# Patient Record
Sex: FEMALE | Race: WHITE | NOT HISPANIC OR LATINO | Employment: STUDENT | URBAN - METROPOLITAN AREA
[De-identification: names, ages, dates, MRNs, and addresses within clinical notes are randomized per-mention and may not be internally consistent; named-entity substitution may affect disease eponyms.]

---
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
## 2024-03-08 DIAGNOSIS — G47.00 INSOMNIA, UNSPECIFIED TYPE: ICD-10-CM

## 2024-03-11 RX ORDER — TRAZODONE HYDROCHLORIDE 50 MG/1
TABLET, FILM COATED ORAL
Qty: 45 TABLET | Refills: 1 | OUTPATIENT
Start: 2024-03-11

## 2024-03-11 NOTE — TELEPHONE ENCOUNTER
According to the last office visit, she had a Psychiatry appointment scheduled and was going to transitioning care to Psychiatry.   A refill was given so that she was not out of medication prior to getting in for Psychiatry evaluation.   If more refills are needed, she will need an appointment, virtual visit will be fine.   Kristen Kehr PA-C

## 2024-03-12 ENCOUNTER — TELEPHONE (OUTPATIENT)
Dept: FAMILY MEDICINE | Facility: CLINIC | Age: 19
End: 2024-03-12

## 2024-03-12 NOTE — TELEPHONE ENCOUNTER
"RN reviewed patient's chart.     Patient had office visit on 23 with Kristen Kehr, PA-C:  Insomnia, unspecified type  - traZODone (DESYREL) 50 MG tablet; 1/2 tablet  BY MOUTH AT BEDTIME as needed for sleep    Medication sent to Claudia in [place] on 23:   traZODone (DESYREL) 50 MG tablet   Si/2 tablet  BY MOUTH AT BEDTIME as needed for sleep     Provider note stated \"Mother is requesting refills of her medication until they can get to the scheduled Psychiatric appointments. Her medications include lexapro, trazodone and hydroxyzine.\"    RN attempted to update Care Everywhere but unable to see if patient saw Psych since 23 appointment with Zonia.    ___________________________________________      RN attempted to contact patient to clarify message received below stating patient now \"taking 1 tablet daily now\". She was prescribed 1/2 tablet by Zonia on 23.    Patient unavailable at the time of the call.  M requesting patient return call to clinic.     If patient returns call to clinic, please clarify what dose of trazodone she is taking and find out if another provider (Psychiatry?) advised her to adjust the dose. Thank you.     Jill Reynolds, RN, BSN, PHN  [facility]  Nurse Triage, Family Practice    "

## 2024-03-13 NOTE — TELEPHONE ENCOUNTER
RN attempted to reach patient on 3/13/24, left message to call back to the clinic.      Reason for call: Conflicting dosages noted for patient's trazodone.  See previous note for full scope.      If patient returns call to clinic:    Please clarify what dose of trazodone she is taking and find out if another provider (Psychiatry?) advised her to adjust the dose.    Kristina Kjellberg, MSN, RN

## 2024-03-14 NOTE — TELEPHONE ENCOUNTER
Attempted to reach pt regarding messages below. See provider's 3/11/24 message below in response to trazodone refill request (found in 3/8/24 refill encounter). There was no answer. Left message to return call to a nurse at [phone].    If patient returns call to clinic please relay provider message below:    Kehr, Kristen M, PA-C   Creation Time: 3/11/2024 10:19 AM     According to the last office visit, she had a Psychiatry appointment scheduled and was going to transitioning care to Psychiatry.   A refill was given so that she was not out of medication prior to getting in for Psychiatry evaluation.   If more refills are needed, she will need an appointment, virtual visit will be fine.   Kristen Kehr PA-C Jennifer Cross, DARIUSZN, RN

## 2024-03-15 NOTE — TELEPHONE ENCOUNTER
Three attempts have been made to reach patient with no return call.   Routing to PCP as BEATRIZI.    Catrachita ARZOLAN, RN

## 2024-05-10 DIAGNOSIS — F42.9 OBSESSIVE-COMPULSIVE DISORDER, UNSPECIFIED TYPE: ICD-10-CM

## 2024-05-10 DIAGNOSIS — F41.9 ANXIETY: ICD-10-CM

## 2024-05-10 DIAGNOSIS — F33.0 MILD EPISODE OF RECURRENT MAJOR DEPRESSIVE DISORDER (H): ICD-10-CM

## 2024-05-13 RX ORDER — ESCITALOPRAM OXALATE 20 MG/1
20 TABLET ORAL DAILY
Qty: 90 TABLET | Refills: 1 | OUTPATIENT
Start: 2024-05-13

## 2024-05-13 NOTE — TELEPHONE ENCOUNTER
Many attempts have been made to clarify this prescription  She was going to be seeing Psychiatry   A refill was given already and tried to reach out to clarify 2 months ago.   She has not returned calls.   Refill denied / clear message was given in March with the last refill.   Kristen Kehr PA-C

## 2024-05-14 DIAGNOSIS — F33.0 MILD EPISODE OF RECURRENT MAJOR DEPRESSIVE DISORDER (H): ICD-10-CM

## 2024-05-14 DIAGNOSIS — F41.9 ANXIETY: ICD-10-CM

## 2024-05-14 DIAGNOSIS — F42.9 OBSESSIVE-COMPULSIVE DISORDER, UNSPECIFIED TYPE: ICD-10-CM

## 2024-05-14 RX ORDER — ESCITALOPRAM OXALATE 20 MG/1
20 TABLET ORAL DAILY
Qty: 10 TABLET | Refills: 0 | Status: SHIPPED | OUTPATIENT
Start: 2024-05-14 | End: 2024-05-16

## 2024-05-14 RX ORDER — HYDROXYZINE HYDROCHLORIDE 25 MG/1
TABLET, FILM COATED ORAL
Status: CANCELLED | OUTPATIENT
Start: 2024-05-14

## 2024-05-14 NOTE — TELEPHONE ENCOUNTER
Medication Question or Refill        What medication are you calling about (include dose and sig)?: Meds pended    Preferred Pharmacy:  [id number] [address] [facility] [facility] [address] [facility] [address]  [address] [facility] [address] [address]  Phone: [phone] Fax: [phone]        Controlled Substance Agreement on file:   CSA -- Patient Level:    CSA: None found at the patient level.       Who prescribed the medication?:     Do you need a refill? Yes- to get to her upcoming appointment     When did you use the medication last?     Patient offered an appointment? No- Patient has an appointment scheduled for 5/29    Do you have any questions or concerns?  Yes- per mom Buffy had a appointment for psychiatry, but then decided she didn't want to go. Mom has been struggling with getting Buffy insurance either under disability through medical assistance, which is why her appointment is for 5/29/24 that she hopes Buffy will have insurance.     I did let mom know that we had attempted several times to contact about medication refills and that I would send this message through, but cannot guarantee provider will refill.   She verbalized understanding.        Okay to leave a detailed message?: Yes at Cell number on file:    Telephone Information:   Mobile [phone]

## 2024-05-14 NOTE — TELEPHONE ENCOUNTER
I sent a refill of the lexapro and will plan to see her 5/16 for her appointment.   Kristen Kehr PA-C

## 2024-05-16 ENCOUNTER — VIRTUAL VISIT (OUTPATIENT)
Dept: FAMILY MEDICINE | Facility: CLINIC | Age: 19
End: 2024-05-16

## 2024-05-16 DIAGNOSIS — G47.00 INSOMNIA, UNSPECIFIED TYPE: ICD-10-CM

## 2024-05-16 DIAGNOSIS — F42.9 OBSESSIVE-COMPULSIVE DISORDER, UNSPECIFIED TYPE: Primary | ICD-10-CM

## 2024-05-16 DIAGNOSIS — F33.0 MILD EPISODE OF RECURRENT MAJOR DEPRESSIVE DISORDER (H): ICD-10-CM

## 2024-05-16 DIAGNOSIS — N94.6 DYSMENORRHEA: ICD-10-CM

## 2024-05-16 DIAGNOSIS — F41.9 ANXIETY: ICD-10-CM

## 2024-05-16 PROCEDURE — 99214 OFFICE O/P EST MOD 30 MIN: CPT | Mod: 95 | Performed by: PHYSICIAN ASSISTANT

## 2024-05-16 RX ORDER — HYDROXYZINE HYDROCHLORIDE 25 MG/1
TABLET, FILM COATED ORAL
Qty: 60 TABLET | Refills: 1 | Status: SHIPPED | OUTPATIENT
Start: 2024-05-16 | End: 2024-09-06

## 2024-05-16 RX ORDER — LEVONORGESTREL/ETHIN.ESTRADIOL 0.1-0.02MG
1 TABLET ORAL DAILY
Qty: 84 TABLET | Refills: 3 | Status: SHIPPED | OUTPATIENT
Start: 2024-05-16

## 2024-05-16 RX ORDER — ESCITALOPRAM OXALATE 20 MG/1
20 TABLET ORAL DAILY
Qty: 90 TABLET | Refills: 1 | Status: SHIPPED | OUTPATIENT
Start: 2024-05-16

## 2024-05-16 ASSESSMENT — ANXIETY QUESTIONNAIRES
GAD7 TOTAL SCORE: 12
IF YOU CHECKED OFF ANY PROBLEMS ON THIS QUESTIONNAIRE, HOW DIFFICULT HAVE THESE PROBLEMS MADE IT FOR YOU TO DO YOUR WORK, TAKE CARE OF THINGS AT HOME, OR GET ALONG WITH OTHER PEOPLE: SOMEWHAT DIFFICULT
2. NOT BEING ABLE TO STOP OR CONTROL WORRYING: MORE THAN HALF THE DAYS
5. BEING SO RESTLESS THAT IT IS HARD TO SIT STILL: SEVERAL DAYS
GAD7 TOTAL SCORE: 12
8. IF YOU CHECKED OFF ANY PROBLEMS, HOW DIFFICULT HAVE THESE MADE IT FOR YOU TO DO YOUR WORK, TAKE CARE OF THINGS AT HOME, OR GET ALONG WITH OTHER PEOPLE?: SOMEWHAT DIFFICULT
3. WORRYING TOO MUCH ABOUT DIFFERENT THINGS: MORE THAN HALF THE DAYS
7. FEELING AFRAID AS IF SOMETHING AWFUL MIGHT HAPPEN: SEVERAL DAYS
6. BECOMING EASILY ANNOYED OR IRRITABLE: SEVERAL DAYS
7. FEELING AFRAID AS IF SOMETHING AWFUL MIGHT HAPPEN: SEVERAL DAYS
1. FEELING NERVOUS, ANXIOUS, OR ON EDGE: MORE THAN HALF THE DAYS
4. TROUBLE RELAXING: NEARLY EVERY DAY

## 2024-05-16 ASSESSMENT — PATIENT HEALTH QUESTIONNAIRE - PHQ9
SUM OF ALL RESPONSES TO PHQ QUESTIONS 1-9: 13
SUM OF ALL RESPONSES TO PHQ QUESTIONS 1-9: 13
10. IF YOU CHECKED OFF ANY PROBLEMS, HOW DIFFICULT HAVE THESE PROBLEMS MADE IT FOR YOU TO DO YOUR WORK, TAKE CARE OF THINGS AT HOME, OR GET ALONG WITH OTHER PEOPLE: VERY DIFFICULT

## 2024-05-16 ASSESSMENT — ENCOUNTER SYMPTOMS: NERVOUS/ANXIOUS: 1

## 2024-05-16 NOTE — PROGRESS NOTES
"    Instructions Relayed to Patient by Virtual Roomer:     If patient is not active on [employer]:  Relayed the following to patient: \"Would you like us to text or email you a link to join your appointment now or when your provider is ready to initiate the virtual visit?\"      Patient Confirmed they will join visit via: Text Link to Cell Phone  Reminded patient to ensure they were logged on to virtual visit by arrival time listed.   Asked if patient has flexibility to initiate visit sooner than arrival time: patient is unable to initiate visit earlier than arrival time     If pediatric virtual visit, ensured pediatric patient along with parent/guardian will be present for video visit.     Patient offered the website [email or web address] and/or phone number to [employer] Help line: [phone]    Buffy is a 18 year old who is being evaluated via a billable video visit.    How would you like to obtain your AVS? [employer]  If the video visit is dropped, the invitation should be resent by: Text to cell phone: [phone]   Will anyone else be joining your video visit? No      Assessment & Plan     Obsessive-compulsive disorder, unspecified type  Anxiety  Mild episode of recurrent major depressive disorder (H24)  Previous plan 6 months ago was to establish care with Psychiatry. They have not scheduled an appointment.   I have put in a referral for  [facility]. She is expressive, but does not verbalize. She most likely needs more mental health assessment for her condition. Mother had some leads on specific Psychiatrists, but has not been able to schedule due to availability  - Adult Mental Health  Referral; Future  - escitalopram (LEXAPRO) 20 MG tablet; Take 1 tablet (20 mg) by mouth daily    Insomnia, unspecified type  - hydrOXYzine HCl (ATARAX) 25 MG tablet; TAKE 1 TABLET BY MOUTH AT BEDTIME AS NEEDED FOR SLEEP. MAY TAKE AN ADDITIONAL TABLET DURING THE DAY AS NEEDED FOR " "ANXIETY    Dysmenorrhea  - levonorgestrel-ethinyl estradiol (AVIANE) 0.1-20 MG-MCG tablet; Take 1 tablet by mouth daily          BMI  Estimated body mass index is 38.7 kg/m  as calculated from the following:    Height as of 11/7/23: 1.638 m (5' 4.5\").    Weight as of 11/7/23: 103.9 kg (229 lb).     Depression Screening Follow Up      Follow Up Actions Taken  Crisis resource information provided in the After Visit Summary    Discussed the following ways the patient can remain in a safe environment:          Liz Baer is a 18 year old, presenting for the following health issues:  Depression and Anxiety      5/16/2024    11:29 AM   Additional Questions   Roomed by linda   Accompanied by mom         5/16/2024    11:29 AM   Patient Reported Additional Medications   Patient reports taking the following new medications multi-vitamin, melotonin 10 mg, tums or omeprazole     Video Start Time: 1:00 PM    Anxiety    History of Present Illness       Mental Health Follow-up:  Patient presents to follow-up on Depression & Anxiety.Patient's depression since last visit has been:  Good  The patient is not having other symptoms associated with depression.  Patient's anxiety since last visit has been:  Good  The patient is not having other symptoms associated with anxiety.  Any significant life events: No  Patient is feeling anxious or having panic attacks.  Patient has no concerns about alcohol or drug use.          4/10/2023    12:59 PM 11/7/2023     1:45 PM 5/16/2024    11:28 AM   PHQ   PHQ-9 Total Score 13 19 13   Q9: Thoughts of better off dead/self-harm past 2 weeks Not at all Several days Several days   F/U: Thoughts of suicide or self-harm  No Yes   F/U: Self harm-plan   No   F/U: Self-harm action   No   F/U: Safety concerns  No No          4/10/2023     1:59 PM 11/7/2023     1:49 PM 5/16/2024    11:26 AM   RACHEL-7 SCORE   Total Score  14 (moderate anxiety) 12 (moderate anxiety)   Total Score 18 14 12      I do believe " mom was hearing all the answers to these questions and there jumping in for something's               Review of Systems  Constitutional, HEENT, cardiovascular, pulmonary, GI, , musculoskeletal, neuro, skin, endocrine and psych systems are negative, except as otherwise noted.      Objective           Vitals:  No vitals were obtained today due to virtual visit.    Physical Exam   GENERAL: alert and no distress  EYES: Eyes grossly normal to inspection.  No discharge or erythema, or obvious scleral/conjunctival abnormalities.  RESP: No audible wheeze, cough, or visible cyanosis.    SKIN: Visible skin clear. No significant rash, abnormal pigmentation or lesions.  NEURO: Cranial nerves grossly intact.  Mentation and speech appropriate for age.  PSYCH: Appropriate affect, did not speak, mother speaks for Buffy          Video-Visit Details    Type of service:  Video Visit   Video End Time: 1:15 PM  Originating Location (pt. Location): Home    Distant Location (provider location):  On-site  Platform used for Video Visit: Mitra  Signed Electronically by: Kristen M. Kehr, PA-C

## 2024-05-16 NOTE — Clinical Note
Alyssa, can you please talk with me about this patient. I believe she will need more evaluation per Psychiatry, but there has been diffiuclty getting her in. Kristen Kehr PA-C

## 2024-09-06 DIAGNOSIS — G47.00 INSOMNIA, UNSPECIFIED TYPE: ICD-10-CM

## 2024-09-06 RX ORDER — HYDROXYZINE HYDROCHLORIDE 25 MG/1
TABLET, FILM COATED ORAL
Qty: 60 TABLET | Refills: 1 | Status: SHIPPED | OUTPATIENT
Start: 2024-09-06

## 2024-11-09 DIAGNOSIS — F42.9 OBSESSIVE-COMPULSIVE DISORDER, UNSPECIFIED TYPE: ICD-10-CM

## 2024-11-09 DIAGNOSIS — F33.0 MILD EPISODE OF RECURRENT MAJOR DEPRESSIVE DISORDER (H): ICD-10-CM

## 2024-11-09 DIAGNOSIS — F41.9 ANXIETY: ICD-10-CM

## 2024-11-09 NOTE — LETTER
November 11, 2024    Buffy Eric  [address]  [place] [address]    Dear Buffy,     We recently received a refill request for Lexapro 20 mg tablets.  We have refilled this for a one time 30 day supply only because of the following:    You were going to transition to Psychiatry.  When is your appointment?  A 30 day prescription has been sent to your pharmacy so you are not without medication.  If you are not established with Psychiatry, please schedule an office visit with Kristen Kehr within the next month for follow up.    Please call at your earliest convenience so that there will not be a delay with your future refills.    Thank you,         Your [facility] Care Team/bmc  [phone]

## 2024-11-11 RX ORDER — ESCITALOPRAM OXALATE 20 MG/1
20 TABLET ORAL DAILY
Qty: 30 TABLET | Refills: 0 | Status: SHIPPED | OUTPATIENT
Start: 2024-11-11

## 2024-11-11 NOTE — TELEPHONE ENCOUNTER
Reminder letter with provider's message given verbatim to patient and mailed.  Lesia RM    [facility]

## 2024-11-11 NOTE — TELEPHONE ENCOUNTER
This patient was going to be transitioning to Psychiatry.   Please contact.   When is her appointment with Psychiatry?   I have sent a 30 day prescription so she is not out of medication.   If she is not established with Psychiatry, please schedule an appointment within the next month for follow up  Thank you.   Kristen Kehr PA-C

## 2024-12-09 DIAGNOSIS — F42.9 OBSESSIVE-COMPULSIVE DISORDER, UNSPECIFIED TYPE: ICD-10-CM

## 2024-12-09 DIAGNOSIS — F41.9 ANXIETY: ICD-10-CM

## 2024-12-09 DIAGNOSIS — F33.0 MILD EPISODE OF RECURRENT MAJOR DEPRESSIVE DISORDER (H): ICD-10-CM

## 2024-12-09 RX ORDER — ESCITALOPRAM OXALATE 20 MG/1
20 TABLET ORAL DAILY
Qty: 30 TABLET | Refills: 0 | OUTPATIENT
Start: 2024-12-09

## 2024-12-10 NOTE — TELEPHONE ENCOUNTER
There has been no response from communication in the letter sent.   No refill given   She was going to be transitioning to Psychiatry.   There has been no follow up with this process.   If she has not see Psychiatry, then an appointment should be scheduled here to discuss medication refills  Kristen Kehr PA-C

## 2024-12-18 ENCOUNTER — TELEPHONE (OUTPATIENT)
Dept: FAMILY MEDICINE | Facility: CLINIC | Age: 19
End: 2024-12-18

## 2024-12-18 DIAGNOSIS — G47.00 INSOMNIA, UNSPECIFIED TYPE: ICD-10-CM

## 2024-12-18 NOTE — TELEPHONE ENCOUNTER
Medication Question or Refill        What medication are you calling about (include dose and sig)?: hydrOXYzine HCl (ATARAX) 25 MG tablet   escitalopram (LEXAPRO) 20 MG tablet  20 mg, DAILY       Preferred Pharmacy:   [id number] - [place], [place] - [address] [place] [address] AT [address] [address]  Phone: [phone] Fax: [phone]      Controlled Substance Agreement on file:       Who prescribed the medication?: dr kehr    Do you need a refill? Yes    When did you use the medication last? Last night    Patient offered an appointment? No    Do you have any questions or concerns?  No- mom stated insurance in process of changing and asking for 1 more refill before they come in for a visit      Okay to leave a detailed message?: Yes at Cell number on file:    Telephone Information:   Mobile [phone]

## 2024-12-19 DIAGNOSIS — F42.9 OBSESSIVE-COMPULSIVE DISORDER, UNSPECIFIED TYPE: ICD-10-CM

## 2024-12-19 DIAGNOSIS — F41.9 ANXIETY: ICD-10-CM

## 2024-12-19 DIAGNOSIS — G47.00 INSOMNIA, UNSPECIFIED TYPE: ICD-10-CM

## 2024-12-19 DIAGNOSIS — F33.0 MILD EPISODE OF RECURRENT MAJOR DEPRESSIVE DISORDER (H): ICD-10-CM

## 2024-12-19 RX ORDER — HYDROXYZINE HYDROCHLORIDE 25 MG/1
TABLET, FILM COATED ORAL
Qty: 60 TABLET | Refills: 1 | Status: SHIPPED | OUTPATIENT
Start: 2024-12-19 | End: 2024-12-19

## 2024-12-19 RX ORDER — ESCITALOPRAM OXALATE 20 MG/1
20 TABLET ORAL DAILY
Qty: 30 TABLET | Refills: 0 | Status: SHIPPED | OUTPATIENT
Start: 2024-12-19

## 2024-12-19 RX ORDER — HYDROXYZINE HYDROCHLORIDE 25 MG/1
TABLET, FILM COATED ORAL
Qty: 60 TABLET | Refills: 0 | Status: SHIPPED | OUTPATIENT
Start: 2024-12-19

## 2025-01-19 DIAGNOSIS — F42.9 OBSESSIVE-COMPULSIVE DISORDER, UNSPECIFIED TYPE: ICD-10-CM

## 2025-01-19 DIAGNOSIS — F41.9 ANXIETY: ICD-10-CM

## 2025-01-19 DIAGNOSIS — F33.0 MILD EPISODE OF RECURRENT MAJOR DEPRESSIVE DISORDER: ICD-10-CM

## 2025-01-20 RX ORDER — ESCITALOPRAM OXALATE 20 MG/1
20 TABLET ORAL DAILY
Qty: 30 TABLET | Refills: 0 | OUTPATIENT
Start: 2025-01-20

## 2025-01-20 NOTE — TELEPHONE ENCOUNTER
Clinic RN: Please investigate patient's chart or contact patient if the information cannot be found because patient should have run out of this medication on 5/7/24. Confirm patient is taking this medication as prescribed. Document findings and route refill encounter to provider for approval or denial.

## 2025-01-21 NOTE — TELEPHONE ENCOUNTER
Letters have been sent to schedule an appointment regarding this condition.   She was going to be transitioning to psychiatry.   Kristen Kehr PA-C

## 2025-01-29 ENCOUNTER — TELEPHONE (OUTPATIENT)
Dept: FAMILY MEDICINE | Facility: CLINIC | Age: 20
End: 2025-01-29

## 2025-01-29 NOTE — TELEPHONE ENCOUNTER
Mother calling , [facility] needs a new referral for a mental health provider.  SUJEY Eckert R.N.

## 2025-01-29 NOTE — TELEPHONE ENCOUNTER
Spoke with both patient and mom, Ani.  They are looking to establish care with an MD.  An appointment has been scheduled for February 3, 2025 at 3:00 pm with Dr. Alcantara.  Lesia RM    [facility]

## 2025-02-03 ENCOUNTER — OFFICE VISIT (OUTPATIENT)
Dept: FAMILY MEDICINE | Facility: CLINIC | Age: 20
End: 2025-02-03

## 2025-02-03 VITALS
SYSTOLIC BLOOD PRESSURE: 119 MMHG | HEART RATE: 96 BPM | HEIGHT: 65 IN | WEIGHT: 229 LBS | DIASTOLIC BLOOD PRESSURE: 80 MMHG | RESPIRATION RATE: 16 BRPM | TEMPERATURE: 97.9 F | BODY MASS INDEX: 38.15 KG/M2 | OXYGEN SATURATION: 99 %

## 2025-02-03 DIAGNOSIS — F33.0 MILD EPISODE OF RECURRENT MAJOR DEPRESSIVE DISORDER: ICD-10-CM

## 2025-02-03 DIAGNOSIS — F41.9 ANXIETY: ICD-10-CM

## 2025-02-03 DIAGNOSIS — G47.00 INSOMNIA, UNSPECIFIED TYPE: ICD-10-CM

## 2025-02-03 DIAGNOSIS — E66.812 CLASS 2 OBESITY WITHOUT SERIOUS COMORBIDITY WITH BODY MASS INDEX (BMI) OF 38.0 TO 38.9 IN ADULT, UNSPECIFIED OBESITY TYPE: ICD-10-CM

## 2025-02-03 DIAGNOSIS — F42.9 OBSESSIVE-COMPULSIVE DISORDER, UNSPECIFIED TYPE: Primary | ICD-10-CM

## 2025-02-03 PROCEDURE — 99214 OFFICE O/P EST MOD 30 MIN: CPT | Performed by: INTERNAL MEDICINE

## 2025-02-03 PROCEDURE — 96127 BRIEF EMOTIONAL/BEHAV ASSMT: CPT | Performed by: INTERNAL MEDICINE

## 2025-02-03 RX ORDER — ESCITALOPRAM OXALATE 20 MG/1
20 TABLET ORAL DAILY
Qty: 90 TABLET | Refills: 0 | Status: SHIPPED | OUTPATIENT
Start: 2025-02-03

## 2025-02-03 RX ORDER — HYDROXYZINE HYDROCHLORIDE 25 MG/1
TABLET, FILM COATED ORAL
Qty: 60 TABLET | Refills: 0 | Status: SHIPPED | OUTPATIENT
Start: 2025-02-03

## 2025-02-03 ASSESSMENT — PATIENT HEALTH QUESTIONNAIRE - PHQ9
10. IF YOU CHECKED OFF ANY PROBLEMS, HOW DIFFICULT HAVE THESE PROBLEMS MADE IT FOR YOU TO DO YOUR WORK, TAKE CARE OF THINGS AT HOME, OR GET ALONG WITH OTHER PEOPLE: EXTREMELY DIFFICULT
SUM OF ALL RESPONSES TO PHQ QUESTIONS 1-9: 26
SUM OF ALL RESPONSES TO PHQ QUESTIONS 1-9: 26

## 2025-02-03 ASSESSMENT — PAIN SCALES - GENERAL: PAINLEVEL_OUTOF10: NO PAIN (0)

## 2025-02-03 NOTE — PROGRESS NOTES
"  Assessment & Plan     (F42.9) Obsessive-compulsive disorder, unspecified type  (primary encounter diagnosis)  Comment: She has obsessive-compulsive disorder.  She was followed by psychiatry.  She is currently on hydroxyzine and Lexapro.  She has an upcoming appointment with a new psychiatrist.  Plan: escitalopram (LEXAPRO) 20 MG tablet             (F33.0) Mild episode of recurrent major depressive disorder  Comment: Noted.  No suicidal thoughts  Plan: escitalopram (LEXAPRO) 20 MG tablet refilled, follow-up with psychiatry             (F41.9) Anxiety  Comment: Noted  Plan: Follow-up with psychiatry             (G47.00) Insomnia, unspecified type  Comment: Noted.  Plan: hydrOXYzine HCl (ATARAX) 25 MG tablet refilled, following up with psychiatry             (E66.812,  Z68.38) Class 2 obesity without serious comorbidity with body mass index (BMI) of 38.0 to 38.9 in adult, unspecified obesity type  Comment: She became upset when I talked about her weight.  Her mom stated that she had issues with eating when she was younger.  Her BMI is 38.7.  This increases her risk for mortality and morbidity.  Plan: This will have to be manage as part of her obsessive-compulsive disorder.          BMI  Estimated body mass index is 38.7 kg/m  as calculated from the following:    Height as of this encounter: 1.638 m (5' 4.5\").    Weight as of this encounter: 103.9 kg (229 lb).   Weight management plan: She will need to have a plan for managing her weight issues with her history of eating disorders    Depression Screening Follow Up        2/3/2025     2:48 PM   PHQ   PHQ-9 Total Score 26    Q9: Thoughts of better off dead/self-harm past 2 weeks Nearly every day   F/U: Thoughts of suicide or self-harm Yes   F/U: Self harm-plan No   F/U: Self-harm action No   F/U: Safety concerns No       Patient-reported         2/3/2025     2:48 PM   Last PHQ-9   1.  Little interest or pleasure in doing things 3   2.  Feeling down, depressed, or " hopeless 3   3.  Trouble falling or staying asleep, or sleeping too much 3   4.  Feeling tired or having little energy 3   5.  Poor appetite or overeating 3   6.  Feeling bad about yourself 3   7.  Trouble concentrating 3   8.  Moving slowly or restless 2   Q9: Thoughts of better off dead/self-harm past 2 weeks 3   PHQ-9 Total Score 26    In the past two weeks have you had thoughts of suicide or self harm? Yes   Do you have concerns about your personal safety or the safety of others? No   In the past 2 weeks have you thought about a plan or had intention to harm yourself? No   In the past 2 weeks have you acted on these thoughts in any way? No       Patient-reported                     Follow Up Actions Taken  Referred patient back to mental health provider  \    Liz Bear is a 19 year old, presenting for the following health issues:  No chief complaint on file.        2/3/2025     2:53 PM   Additional Questions   Roomed by Terrie DEL RIO   Accompanied by MOM     History of Present Illness       Reason for visit:  Medication   She is taking medications regularly.         19-year-old female presents for [facility] care.  She has a history of depression, OCD, insomnia, anxiety, and an eating disorder in the past.  She has been followed by psychiatry in the past.  She has an appointment with Patrizia Solis.  She needs refills on her Lexapro and hydroxyzine until that appointment.  She is due for a flu shot and HPV vaccine.  She and her mother are both uncomfortable with the vaccines at this time.  They wish to research these vaccines.         Review of Systems  Constitutional, HEENT, cardiovascular, pulmonary, gi and gu systems are negative, except as otherwise noted.      Objective    There were no vitals taken for this visit.  There is no height or weight on file to calculate BMI.  Physical Exam   GENERAL: alert and no distress  EYES: Eyes grossly normal to inspection, PERRL and conjunctivae and sclerae  normal  HENT: normal cephalic/atraumatic, both ears: Some scarring, minimal wax, nose and mouth without ulcers or lesions, oropharynx clear, and oral mucous membranes moist  NECK: no adenopathy, no asymmetry, masses, or scars  RESP: lungs clear to auscultation - no rales, rhonchi or wheezes  CV: regular rate and rhythm, normal S1 S2, no S3 or S4, no murmur, click or rub, no peripheral edema  ABDOMEN: soft, nontender, no hepatosplenomegaly, no masses and bowel sounds normal  MS: no gross musculoskeletal defects noted, no edema  SKIN: no suspicious lesions or rashes  NEURO: Normal strength and tone, mentation intact and speech normal  PSYCH: mentation appears normal, affect normal/bright            Signed Electronically by: Justin Alcantara MD

## 2025-02-18 ENCOUNTER — VIRTUAL VISIT (OUTPATIENT)
Dept: PSYCHIATRY | Facility: CLINIC | Age: 20
End: 2025-02-18

## 2025-02-18 DIAGNOSIS — F43.10 PTSD (POST-TRAUMATIC STRESS DISORDER): ICD-10-CM

## 2025-02-18 DIAGNOSIS — F41.9 ANXIETY: Primary | ICD-10-CM

## 2025-02-18 DIAGNOSIS — F42.9 OBSESSIVE-COMPULSIVE DISORDER, UNSPECIFIED TYPE: ICD-10-CM

## 2025-02-18 DIAGNOSIS — F33.0 MILD EPISODE OF RECURRENT MAJOR DEPRESSIVE DISORDER: ICD-10-CM

## 2025-02-18 PROCEDURE — G2211 COMPLEX E/M VISIT ADD ON: HCPCS | Mod: 95 | Performed by: STUDENT IN AN ORGANIZED HEALTH CARE EDUCATION/TRAINING PROGRAM

## 2025-02-18 PROCEDURE — 98002 SYNCH AUDIO-VIDEO NEW MOD 45: CPT | Performed by: STUDENT IN AN ORGANIZED HEALTH CARE EDUCATION/TRAINING PROGRAM

## 2025-02-18 RX ORDER — BUSPIRONE HYDROCHLORIDE 15 MG/1
TABLET ORAL
Qty: 53 TABLET | Refills: 0 | Status: SHIPPED | OUTPATIENT
Start: 2025-02-19 | End: 2025-03-21

## 2025-02-18 RX ORDER — ESCITALOPRAM OXALATE 20 MG/1
20 TABLET ORAL DAILY
Qty: 90 TABLET | Refills: 0 | Status: SHIPPED | OUTPATIENT
Start: 2025-02-18

## 2025-02-18 ASSESSMENT — ANXIETY QUESTIONNAIRES
2. NOT BEING ABLE TO STOP OR CONTROL WORRYING: NEARLY EVERY DAY
1. FEELING NERVOUS, ANXIOUS, OR ON EDGE: NEARLY EVERY DAY
GAD7 TOTAL SCORE: 17
5. BEING SO RESTLESS THAT IT IS HARD TO SIT STILL: NEARLY EVERY DAY
GAD7 TOTAL SCORE: 17
8. IF YOU CHECKED OFF ANY PROBLEMS, HOW DIFFICULT HAVE THESE MADE IT FOR YOU TO DO YOUR WORK, TAKE CARE OF THINGS AT HOME, OR GET ALONG WITH OTHER PEOPLE?: EXTREMELY DIFFICULT
GAD7 TOTAL SCORE: 17
7. FEELING AFRAID AS IF SOMETHING AWFUL MIGHT HAPPEN: SEVERAL DAYS
IF YOU CHECKED OFF ANY PROBLEMS ON THIS QUESTIONNAIRE, HOW DIFFICULT HAVE THESE PROBLEMS MADE IT FOR YOU TO DO YOUR WORK, TAKE CARE OF THINGS AT HOME, OR GET ALONG WITH OTHER PEOPLE: EXTREMELY DIFFICULT
6. BECOMING EASILY ANNOYED OR IRRITABLE: SEVERAL DAYS
3. WORRYING TOO MUCH ABOUT DIFFERENT THINGS: NEARLY EVERY DAY
7. FEELING AFRAID AS IF SOMETHING AWFUL MIGHT HAPPEN: SEVERAL DAYS
4. TROUBLE RELAXING: NEARLY EVERY DAY

## 2025-02-18 ASSESSMENT — PAIN SCALES - GENERAL: PAINLEVEL_OUTOF10: NO PAIN (0)

## 2025-02-18 ASSESSMENT — ENCOUNTER SYMPTOMS
DIZZINESS: 1
BLURRED VISION: 1

## 2025-02-18 ASSESSMENT — PATIENT HEALTH QUESTIONNAIRE - PHQ9
SUM OF ALL RESPONSES TO PHQ QUESTIONS 1-9: 14
10. IF YOU CHECKED OFF ANY PROBLEMS, HOW DIFFICULT HAVE THESE PROBLEMS MADE IT FOR YOU TO DO YOUR WORK, TAKE CARE OF THINGS AT HOME, OR GET ALONG WITH OTHER PEOPLE: VERY DIFFICULT
SUM OF ALL RESPONSES TO PHQ QUESTIONS 1-9: 14

## 2025-02-18 NOTE — NURSING NOTE
Current patient location: [address] [place] [id number]    Is the patient currently in the state of [place]? YES    Visit mode: VIDEO    If the visit is dropped, the patient can be reconnected by:VIDEO VISIT: Text to cell phone:   Telephone Information:   Mobile [phone]    and VIDEO VISIT: Send to e-mail at: [email or web address]    Will anyone else be joining the visit? NO  (If patient encounters technical issues they should call [phone])    Are changes needed to the allergy or medication list? No    Are refills needed on medications prescribed by this physician? NO    Rooming Documentation:  Questionnaire(s) completed    Reason for visit: Consult    Nicolette SOOD       Final Anesthesia Post-op Assessment    [facility]  [place]cedure(s) Performed: COLONOSCOPYEGD  Anesthesia type: General    Vitals Value Taken Time   Temp  05/03/22 1220   Pulse 97 05/03/22 1142   Resp 20 05/03/22 1142   SpO2 98 % 05/03/22 1142   /92 05/03/22 1142         Patient Location: Phase II  Post-op Vital Signs:stable  Level of Consciousness: awake, alert, participates in exam and oriented  Respiratory Status: spontaneous ventilation  Cardiovascular stable  Hydration: euvolemic  Pain Management: well controlled  Vomiting: none  Nausea: None  Airway Patency:patent  Post-op Assessment: awake, alert, appropriately conversant, or baseline, no complications, patient tolerated procedure well with no complications, no evidence of recall, dentition within defined limits, moving all extremities and No Corneal Abrasion      No complications documented.

## 2025-02-18 NOTE — PROGRESS NOTES
"Virtual Visit Details    Type of service:  Video Visit   Video Start Time: {video visit start/end time for provider to select:940165}  Video End Time:{video visit start/end time for provider to select:981840}    Originating Location (pt. Location): {video visit patient location:006944::\"Home\"}  {PROVIDER LOCATION On-site should be selected for visits conducted from your clinic location or adjoining [facility] hospital, academic office, or other nearby [facility] building. Off-site should be selected for all other provider locations, including home:804137}  Distant Location (provider location):  {virtual location provider:513962}  Platform used for Video Visit: {Virtual Visit Platforms:620342::\"Visionary Fun\"}  "

## 2025-02-18 NOTE — PROGRESS NOTES
"Virtual Visit Details     Type of service:  Video Visit   Video Start Time: 3:09 PM  Video End Time:4:02 PM    Originating Location (pt. Location): Home  Distant Location (provider location):  On-site  Platform used for Video Visit: [facility] [place] [address], [place], [address]  [place] [address]  [phone]     OUTPATIENT PSYCHIATRIC EVALUATION     2025    Provider: PRINCE Awad CNP      Appointment Start Time: 3:09 PM  Appointment End Time: 4:02 PM     Name: Buffy Eric   : 2005                    Preferred Name: Buffy    Identification: Buffy Eric is 19 year old female who is referred from Kristen Kehr, PA-C     Persons Present in Session / Source of Information:  with mother , who they agreed to have interview with.     RECORDS AVAILABLE FOR REVIEW: EHR records through [facility] , previous psychiatric progress note, and Care Everywhere accessed.       Telemedicine Visit: The patient's condition can be safely assessed and treated via synchronous audio and visual telemedicine encounter.   Consent:  The patient/guardian has verbally consented to: the potential risks and benefits of telemedicine (video visit or phone) versus in person care; bill my insurance or make self-payment for services provided; and responsibility for payment of non-covered services.  As the provider I attest to compliance with applicable laws and regulations related to telemedicine.    Chief Complaint      Medication Management     History of Present Illness     Buffy Eric is 19 year old female with a past psychiatric history of Major Depressive Disorder, Obsessive Compulsive Disorder, unspecified anxiety, and PTSD who has been referred by primary care to panel psychiatry services to establish care for medication management.    Patient reports that symptoms have been \"stable\" on her current medications, however she " "has been unable to maintain employment or live independently.  She continues to experience multiple mood swings throughout the day from feeling elated to irritable.  Endorses anhedonia, low energy, and low self worth daily.  Started engaging in self-injurious behavior via cutting approximately 1 year ago, this was occurring daily at first but has since decreased to 1 episode per month.  Patient reports a history of multiple suicide attempts in adolescence however reflects that the actions she took at the time were nonlethal and likely did not fully intend to commit suicide.  Continues to endorse passive suicidal ideation several days of the week, reports these thoughts are always present but will change in intensity and frequency.  Recently these thoughts have been less intense and less frequent with her current medication regimen.  Mother reports patient's current medications \"help a lot\" compared to periods of time when patient was not taking medications, \"things were  horrible.   Mother is actively involved in patient's care, assists with medication compliance, and provides a safe, supportive environment.    Patient finds her anxiety symptoms are currently causing the most distress.  Reports restlessness/feeling on edge, muscle tension, brain fog, fatigue, and difficulty initiating sleep daily.  Frequently feels overwhelmed.  Patient reports she has been unable to maintain a job, will go through the interview process and attend 1 day of work before her anxiety becomes unmanageable and will not return.  Patient also reports experiencing trauma in adolescence while she was hospitalized for her mental health.  Medical appointments continue to cause distress, nervousness, and avoidance.  Patient also reports intrusive thoughts that cause significant distress, content includes sexual trauma, hurting animals, hurting herself, and driving a car into traffic.  Patient reports she has never acted on these thoughts and " feels guilty when they occur.    Patient has been hospitalized for her mental health multiple times ([facility], [facility] ~2017, [facility] [id number], and out of state), first occurring at age 13.  History of IOP, PHP, Day Treatment, and Residential Treatment.  Has received individual psychotherapy 3 times in the past.  Not currently engaged in psychotherapy services.     Past Psychiatric History      Diagnoses: Major Depressive Disorder, Obsessive Compulsive Disorder, unspecified anxiety, and PTSD. Patient also reports history of disordered eating.    Treatment by psychiatric providers since 13.  Psychotherapy x3 in the past.    History of Psychiatric Hospitalizations:   - Inpatient:  Yes: Multiple hospitalizations including [facility], [facility], [facility] and other out of state treatments  - IOP/PHP/Day treatment: Yes  - Commitment: Denies  - ECT or TMS: Denies  History of Suicide Ideations: Yes  History of Suicide Attempts: Yes: Intent present but did not use lethal means   History of Self-injurious Behavior: Cutting and Scratching.  Current:  Yes started 1 year ago, used to be daily, now occurs 1x per month.   History of Violence/Aggression: Denies    Previous Psychotropic Medication Trials:  Sertraline (Zoloft): made things worse  Quetiapine (Seroquel)   Hydroxyzine (Atarax)  Trazodone 50 mg at hs    Medication Compliance: Yes  Pharmacogenomic Testing Completed: No    Developmental History      Did not discuss in depth this appointment. Patient reports history of IEP, mother verbalized concerns for ADHD and ASD. Will obtain more thorough history at next appointment.    Medical History       Head injuries: Denies  Seizures: Denies  Cardiac events: Denies  Thyroid dysfunction: Denies  Tardive Dyskinesia: Denies     Primary Care Provider: [facility]     Past Medical History:   Diagnosis Date    OCD (obsessive compulsive disorder) 09/04/2018    Suicidal ideation     when on  zoloft, no plan     Past Surgical History:   Procedure Laterality Date    CHOLECYSTECTOMY       Family History      Family Psychiatric History   Immediate family: Anxiety  Family history of suicide attempts or completions: Unknown  Family treatment history: unknown    Family Medical History  Family History   Problem Relation Age of Onset    Anxiety Disorder Mother     Diabetes Maternal Grandmother     Parkinsonism Maternal Grandmother     Ovarian Cancer Maternal Grandmother     Lung Cancer Maternal Grandfather     Cerebrovascular Disease Paternal Grandmother     Bone Cancer Paternal Grandfather      Social History       Origin: [place]  Highest level of education completed: High School Graduate  Employment: Unemployed  Relationship status: Single.   Children: 0  Current lives in [place] [facility] [place] with Mother.  Cultural/Spiritual/ethnic background: N/A  Trauma/Abuse history: Previous trauma/Abuse experience  medical trauma from past hospitalizations   history: No  Legal History: No: Patient denies any legal history  Firearms: No: Patient denies    Support Systems/Strengths/Hobbies: Patient identified their mother as their support system.  Current Stressors: employment difficulties and applying for college    Substance Abuse History       ETOH: denies  THC: denies  Opiates: denies  Benzodiazepines: denies  Stimulants:denies  Nicotine: denies  Caffeine: denies    History of Treatment: denies    Allergies      Lorazepam and Zoloft [sertraline]    Current Medications       [place]MP: I have queried the [place] and/or [place] Prescription Monitoring Program for this patient for the preceding 12 months, or reviewed the report provided by my proxy delegate. I have not identified any concerns.     Current Prescribed Psychotropic Medications:  Escitalopram (Lexapro) 20 mg tablet daily  Hydroxyzine (Atarax) 25 mg tablet twice a day as needed for anxiety or sleep    Patient is taking medications are prescribed.  Patient denies current side  effects or concerns.  Patient denies any difficulties with taking medications.     Current Outpatient Medications   Medication Sig Dispense Refill    escitalopram (LEXAPRO) 20 MG tablet Take 1 tablet (20 mg) by mouth daily. 90 tablet 0    hydrOXYzine HCl (ATARAX) 25 MG tablet TAKE 1 TABLET BY MOUTH AT BEDTIME AS NEEDED FOR SLEEP. MAY TAKE AN ADDITIONAL TABLET DURING THE DAY AS NEEDED FOR ANXIETY 60 tablet 0     Medical Review of Systems      10 systems (general, cardiovascular, respiratory, eyes, ENT, endocrine, GI, , M/S, neurological) were reviewed.     Review of Systems   Eyes:  Positive for blurred vision.   Musculoskeletal:  Positive for joint pain.   Neurological:  Positive for dizziness.   All other systems reviewed and are negative.    Patient's last menstrual period was 01/21/2025 (approximate).  Pregnancy status: Not pregnant    Patient and mother are suspicious of a possible postural orthostatic tachycardia syndrome (POTS) diagnosis and have discussed this with their primary care provider in the past.     Psychiatric Review of Systems      Comprehensive review of symptoms completed, pertinent positives noted below:    Depression: depressed mood, anhedonia, worthlessness, low energy, appetite changes, and recurrent thoughts of death or suicide  Anxiety: excessive worry, poor concentration, and restlessness/feeling on edge  Panic: palpitations  Vika: no symptoms  Psychosis: no symptoms  OCD: recurrent intrusive thoughts, urges, or images  Trauma: experienced trauma, distressing memories, exposure to triggers causing distress, avoidance of external reminders, negative emotions, and decreased participation/interest  Eating: Concern for body shape/weight  Disruptive/Impulse/Conduct: No symptoms  Neurodevelopmental: impaired attention and dislikes sustained mental effort  Neurocognitive: no symptoms  Substance/Addiction: no symptoms  Sleep: Duration: 8 hrs/night. difficulty initiating sleep  Safety: Endorses  "passive SI, fleeting over the past two weeks, chronic in nature, without plan or intent. Denies active SI, SIB (thoughts or behaviors), HI, property destruction, and aggression.    Vitals      Virtual visit. Not completed today.     Labs      Most recent laboratory results reviewed and pertinent results include:     Recent Labs   Lab Test 04/10/23  1403   WBC 10.8   HGB 11.0*   HCT 34.6*   MCV 79        No lab results found.  Recent Labs   Lab Test 04/10/23  1403   A1C 5.4     Recent Labs   Lab Test 04/10/23  1403   TSH 0.72   T4 0.91     EKG: No EKG on file.     Mental Status Exam      Appearance: adequately groomed  Behavior: cooperative, pleasant, and calm, with good eye contact   Speech:  normal and regular rate and rhythm  Attention/Concentration: appropriate  Mood: \"Not great\"  Affect: full range and appropriate; was congruent to mood; was congruent to content  Thought Process:  unremarkable  Thought Content  Perception Disturbances: Reports none;  Denies auditory hallucinations and visual hallucinations   Suicidality: No evidence of active or passive SI.  Homicidality: No evidence of HI, or any intention to harm others.   Insight: intact  Judgment: intact  Cognition: does  appear grossly intact; formal cognitive testing was not done  Recent and Remote Memory: grossly intact to interview. Not formally assessed.   Fund of Knowledge: appropriate  Gait and Station: unable to assess virtually    Screening Tools          2/18/2025     2:55 PM 2/3/2025     2:48 PM 5/16/2024    11:28 AM   PHQ   PHQ-9 Total Score 14  26  13   Q9: Thoughts of better off dead/self-harm past 2 weeks Several days  Nearly every day Several days    F/U: Thoughts of suicide or self-harm Yes  Yes Yes    F/U: Self harm-plan No  No No    F/U: Self-harm action No  No No    F/U: Safety concerns No  No No        Patient-reported    Proxy-reported         2/18/2025     2:56 PM 5/16/2024    11:26 AM 11/7/2023     1:49 PM   RACHEL-7 SCORE   Total " Score 17 (severe anxiety) 12 (moderate anxiety) 14 (moderate anxiety)   Total Score 17  12 14       Proxy-reported     CAGE-AID:       2025     2:59 PM   CAGE-AID Total Score   Total Score 0    Total Score MyChart 0 (A total score of 2 or greater is considered clinically significant)       Proxy-reported     PROMIS 10-Global Health (only subscores and total score):       2025     2:58 PM   PROMIS-10 Scores Only   Global Mental Health Score 5    Global Physical Health Score 7    PROMIS TOTAL - SUBSCORES 12        Proxy-reported     Risk Assessment      Feels safe in home: Yes   Suicidal ideation: chronic, no intent or plan  History of suicide attempts:  Yes by non lethal means    Hx of impulsivity: No   Hope for the future: present  Hx of Command hallucinations or current psychosis: No  History of Self-injurious behaviors: Yes Current:  Yes ~1x per month, most recently 1 month ago  Family member  by suicide:   Unknown    Suicide Risk Factors: Previous self harm, diagnosis of a mental disorder (especially depression or mood disorders),  or single status, prior suicide attempts, and guilt    Risk is mitigated by the following protective factors: access to behavioral health care, active involvement in treatment, connectedness to individuals, family, forward thinking, future oriented, stable housing, no access to weapons, and no current SI     Based on review of above risk and protective factors and today's exam, Buffy does not appear to be an imminent risk of harm to self or others, does not meet criteria for a 72-hr hold, and therefore remains appropriate for ongoing outpatient level of care. The patient convincingly denies active suicidality and homicidality today. There was no deceit detected, and the patient presented in a manner that was believable. Local community safety resources reviewed as needed and routinely attached to AVS. The patient/guardian understands to call 911 or go to the  nearest ED if urgent or life threatening symptoms occur.    Assessment     Buffy Eric is 19 year old female with a past psychiatric history of Major Depressive Disorder, Obsessive Compulsive Disorder, unspecified anxiety, and PTSD who presents today to establish care with panel psychiatry services for medication management.    Significant symptoms include chronic passive SI, self-injurious behaviors, irritability, excessive worry, restlessness, low energy, anhedonia, mood swings, and intrusive thoughts that cause distress.     Psychiatric history reveals multiple psychiatric hospitalizations and reported suicide attempts via nonlethal means.  Currently engages in self-injurious behavior via cutting. Psychotherapy with unknown benefit in the past. There is genetic loading for anxiety. Medical history does appear to be significant for iron deficiency anemia and vitamin D deficiency per labs completed 4/10/2023. Substance use does not appear to be significantly contributing to the patient's current presentation. Stressors include employment difficulties and applying for college. Patient's support system includes her mother.    Regarding medication management, patient has seen the most benefit from Lexapro 20 mg in comparison to previous med trials and would like to continue this medication.  We discussed the possibility of trying a different antidepressant in hopes of finding one that would provide full coverage for depression symptoms, however patient and mother are both concerned switching medications might destabilize the patient. Patient reports that her anxiety symptoms are causing more distress than her depressive symptoms and we agreed to target these symptoms first. Buspar will be started at 7.5 mg twice a day for 7 days then increased to 15 mg twice a day pending tolerability.  Patient was encouraged to continue using hydroxyzine 25 mg as needed to target daytime anxiety.     Will order labs (TSH with T4  Reflex, Vitamin D deficiency, CBC with differential, CMP) to determine if historical deficiencies are being appropriately treated and to rule out underlying medical cause of current symptoms.    Will place referral for individual psychotherapy as patient expressed interest in nonpharmacological modalities of treatment. A referral will also be placed for psychological testing to rule out ADHD and ASD per patient request.     Medication side effects and alternatives reviewed. Health promotion activities recommended and reviewed today. All questions addressed. Education and counseling completed regarding risks and benefits of medications and psychotherapy options.     There are no language or communication issues or need for modification in treatment.   There are no ethnic, cultural or Confucianist factors that may be relevant for treatment.  Patient identified their preferred language to be English.  Patient does not need the assistance of an  or other support involved in treatment.    Diagnosis      296.30 (F33.9) Major Depressive Disorder, Recurrent Episode, Unspecified with anxious distress  300.3 (F42) Obsessive Compulsive Disorder  309.81 (F43.10) Posttraumatic Stress Disorder  780.52 (G47.00) Insomnia Disorder, unspecified    Medical Comorbidities Include:   Patient Active Problem List    Diagnosis Date Noted    Mild episode of recurrent major depressive disorder 07/19/2021     Priority: Medium    Insomnia, unspecified type 07/19/2021     Priority: Medium    OCD (obsessive compulsive disorder) 09/04/2018     Priority: Medium    Anxiety 09/04/2018     Priority: Medium     Plan      Medications:  START buspirone 15 mg 0.5 tablet twice a day for 7 days THEN increase to 1 tablet twice a day for anxiety. Script sent for #53. 0 Refills.  CONTINUE escitalopram (Lexapro) 20 mg tablet daily. Script sent for #90.  CONTINUE hydroxyzine (Atarax) 25 mg tablet twice a day as needed for anxiety or sleep. No script  sent.  Continue all other medications per primary care provider.      New Labs/Orders/Referrals:   Labs: TSH with T4 Reflex, Vitamin D deficiency, CBC with differential, CMP  Referral placed for individual psychotherapy  Referral placed for psych testing: specifically ASD and ADHD    Follow up: Attend appointment on 3/18/25 at 2 pm or schedule an appointment with me in sooner as needed.  Call [facility] at [phone] to schedule   Plan to Discuss: Starting buspar    Safety plan reviewed. To the Emergency Department as needed or call after hours crisis line at [phone] or [phone].   [place] Crisis Text Line: Text [place] to 593960 or Suicide LifeLine Chat: suicideGameotic.org/chat  Follow up with primary care provider as planned or sooner if needed for acute medical concerns.  Call the psychiatric nurse line with medication questions or concerns at [phone].  Stylefinchhart may be used to communicate with your provider, but this is not intended to be used for emergencies.    Education and Resources     Community Resources:    National Suicide Prevention Lifeline: 865.110.6700 (TTY: 977.948.1963). Call anytime for help.  (www.suicidepreventionlifeline.org)  National [place] on Mental Illness (www.kerri.org): 921.269.6106 or 367-853-5670.   Mental Health Association (www.mentalhealth.org): 161.435.1282 or 135-487-0683.  [place] Crisis Text Line: Text [place] to 504388  Suicide LifeLine Chat: suicideGameotic.org/chat    Patient Education:  If applicable the following has been discussed with the patient, parent/guardian, and or attending family member during the appointment:  Risks of polypharmacy and possible drug interactions with current medication list + common OTC products, herbs, and supplements. Moving forward, it is suggested to intermittently check-in with a clinic or retail pharmacist whenever new medications or OTC/h/s are consumed.  Recommendation to adhere to CDC  guidelines as it relates alcohol consumption. If taking benzodiazepines, you should abstain from alcohol intake due to increased risks of CNS and respiratory depression, as well as psychomotor impairment.  If possible, it is recommended to avoid concurrent use of prescribed: opioids  +  benzodiazepines due to increased risks of CNS and respiratory depression, as well as the increased risk of overdose.  Recommendation to minimize and or abstain from THC use (unless the pt. is prescribed medical marijuana).  Recommendation to abstain from illicit substances including but not limited to the following: heroin, street fentanyl, cocaine, methamphetamines, and bath salts.  Do not take opioids, stimulants, and or other prescription medications unless they are specifically prescribed for you.  Recommendation to abstain from tobacco/smoking, alcohol, THC, and all illicit substances if trying to become or are pregnant.  Black Box Warnings associated with the prescribed psychotropic(s).  Potential adverse effects of antipsychotics including but not limited to the following: weight gain, metabolic syndrome, EPS/Tardive Dyskinesias.  Potential CV and neurological adverse effects of stimulants including but not limited to the following:  sudden death, MI, stroke, HTN, cardiomyopathy (long term use) as well as seizures.  The benefits of healthy lifestyle modifications such as regular physical activity, sleep hygiene, a balanced diet, practicing mindfulness, and stress management.  Safety recommendations such as securing all prescription and OTC medications, sharps, and caustic substances in addition to removing all firearms and ammunition from the home.     Administrative Billing:   Level of Medical Decision Making:   - *HIGH ACUITY* - At least 1 chronic problem with severe exacerbation, progression, or side effects of treatment  - Moderate (or greater) risk of harm to self or others    Supportive therapy was provided, focusing on  reflective listening and solution focused problem solving.    The longitudinal plan of care for the diagnosis(es)/condition(s) as documented were addressed during this visit. Due to the added complexity in care, I will continue to support Buffy in the subsequent management and with ongoing continuity of care.     Signed:   Patrizia Solis, ROBERT, APRN, PMHNP-BC....................  2/20/2025   11:02 AM     Disclaimer: This note consists of symbols derived from keyboarding, dictation and/or voice recognition software. As a result, there may be errors in the script that have gone undetected. Please consider this when interpreting information found in this chart.

## 2025-03-08 ENCOUNTER — HEALTH MAINTENANCE LETTER (OUTPATIENT)
Age: 20
End: 2025-03-08

## 2025-07-22 ENCOUNTER — MYC MEDICAL ADVICE (OUTPATIENT)
Dept: PSYCHIATRY | Facility: CLINIC | Age: 20
End: 2025-07-22

## 2025-07-22 DIAGNOSIS — F41.9 ANXIETY: ICD-10-CM

## 2025-07-22 DIAGNOSIS — F33.0 MILD EPISODE OF RECURRENT MAJOR DEPRESSIVE DISORDER: ICD-10-CM

## 2025-07-22 DIAGNOSIS — F42.9 OBSESSIVE-COMPULSIVE DISORDER, UNSPECIFIED TYPE: ICD-10-CM

## 2025-07-22 NOTE — TELEPHONE ENCOUNTER
See these encounters for further details:  [facility] Medical Advice with Patrizia Solis APRN CNP (07/22/2025)     And  [facility] Refill with Patrizia Solis APRN CNP (07/22/2025)     And:  [facility] Refill with Patrizia Solis APRN CNP (07/22/2025)     Date of Last Office Visit: 2/18/25  Date of Next Office Visit: None; routing for [facility] to assist pt with scheduling.    No shows since last visit: No  More than one patient-initiated cancellation (with reschedule) since last seen in clinic? No    []Medication refilled per  Medication Refill in Ambulatory Care  policy.  []Scope of Practice: refill request processed by LPN/MA  [x]Medication unable to be refilled by RN due to criteria not met as indicated below:    []Eligibility: has not had a provider visit within last 6 months   [x]Supervision: no future appointment; < 7 days before next appointment   []Compliance: no shows; cancellations; lapse in therapy   []Verification: order discrepancy; may need modification...   [x] > 30-day supply request   []Advanced refill request: > 7 days before refill date   []Controlled medication   []Medication not included in policy   []Review: new med; med adjusted <= 30 days; safety alert; requires lab monitoring...   []Other:      Medication(s) requested:     -  escitalopram (LEXAPRO) 20 MG tablet  Date last ordered: 2/18/25  Qty: 90  Refills: 0  Appropriate for refill? Provider to review. Prescription was picked up on 4/19/25    Any Controlled Substance(s)? No      Requested medication(s) verified as identical to current order? Yes    Any lapse in adherence to medication(s) greater than 5 days? No      Additional action taken? cued up medication/order(s) and routed encounter to provider for review.      Last visit treatment plan:     Medications:   START buspirone 15 mg 0.5 tablet twice a day for 7 days THEN increase to 1 tablet twice a day for anxiety. Script sent for #53. 0 Refills.   CONTINUE escitalopram (Lexapro) 20 mg tablet daily. Script sent for  #90.   CONTINUE hydroxyzine (Atarax) 25 mg tablet twice a day as needed for anxiety or sleep. No script sent.   Continue all other medications per primary care provider.                 New Labs/Orders/Referrals:    Labs: TSH with T4 Reflex, Vitamin D deficiency, CBC with differential, CMP   Referral placed for individual psychotherapy   Referral placed for psych testing: specifically ASD and ADHD     Follow up: Attend appointment on 3/18/25 at 2 pm or schedule an appointment with me in sooner as needed.  Call [facility] at [phone] to schedule              Plan to Discuss: Starting buspar      Safety plan reviewed. To the Emergency Department as needed or call after hours crisis line at [phone] or [phone].    [place] Crisis Text Line: Text [place] to 770262 or Suicide LifeLine Chat: suicidepreventionlifeline.org/chat   Follow up with primary care provider as planned or sooner if needed for acute medical concerns.   Call the psychiatric nurse line with medication questions or concerns at [phone].   [employer] may be used to communicate with your provider, but this is not intended to be used for emergencies.      Is lab monitoring required? No    Linda Burnett RN on 7/22/2025 at 4:19 PM

## 2025-07-23 RX ORDER — ESCITALOPRAM OXALATE 20 MG/1
20 TABLET ORAL DAILY
Qty: 90 TABLET | Refills: 0 | Status: SHIPPED | OUTPATIENT
Start: 2025-07-23

## 2025-07-23 NOTE — TELEPHONE ENCOUNTER
RN sent [employer] message to patient with information about ROIs and granting Proxy Access. Her mom sent a [employer] message yesterday, Mom does not appear to have Proxy Access, and no TONO on file for her.     See this encounter:  [facility] Medical Advice with Patrizia Solis APRN CNP (07/22/2025)     Linda Burnett RN on 7/23/2025 at 9:27 AM

## 2025-09-03 ENCOUNTER — MYC REFILL (OUTPATIENT)
Dept: PSYCHIATRY | Facility: CLINIC | Age: 20
End: 2025-09-03

## 2025-09-03 DIAGNOSIS — G47.00 INSOMNIA, UNSPECIFIED TYPE: ICD-10-CM

## 2025-09-03 RX ORDER — HYDROXYZINE HYDROCHLORIDE 25 MG/1
TABLET, FILM COATED ORAL
Qty: 60 TABLET | Refills: 2 | Status: SHIPPED | OUTPATIENT
Start: 2025-09-03